# Patient Record
Sex: FEMALE | Race: WHITE | NOT HISPANIC OR LATINO | URBAN - METROPOLITAN AREA
[De-identification: names, ages, dates, MRNs, and addresses within clinical notes are randomized per-mention and may not be internally consistent; named-entity substitution may affect disease eponyms.]

---

## 2017-06-30 ENCOUNTER — EMERGENCY (EMERGENCY)
Facility: HOSPITAL | Age: 22
LOS: 1 days | Discharge: HOME | End: 2017-06-30
Attending: EMERGENCY MEDICINE | Admitting: EMERGENCY MEDICINE
Payer: COMMERCIAL

## 2017-06-30 VITALS
RESPIRATION RATE: 20 BRPM | OXYGEN SATURATION: 100 % | TEMPERATURE: 99 F | HEART RATE: 85 BPM | DIASTOLIC BLOOD PRESSURE: 83 MMHG | SYSTOLIC BLOOD PRESSURE: 131 MMHG

## 2017-06-30 VITALS — HEIGHT: 63 IN | WEIGHT: 132.94 LBS

## 2017-06-30 PROCEDURE — 12001 RPR S/N/AX/GEN/TRNK 2.5CM/<: CPT

## 2017-06-30 PROCEDURE — 99283 EMERGENCY DEPT VISIT LOW MDM: CPT | Mod: 25

## 2017-06-30 PROCEDURE — 73110 X-RAY EXAM OF WRIST: CPT | Mod: 26,RT

## 2017-06-30 PROCEDURE — 73110 X-RAY EXAM OF WRIST: CPT

## 2017-06-30 RX ORDER — TETANUS TOXOID, REDUCED DIPHTHERIA TOXOID AND ACELLULAR PERTUSSIS VACCINE, ADSORBED 5; 2.5; 8; 8; 2.5 [IU]/.5ML; [IU]/.5ML; UG/.5ML; UG/.5ML; UG/.5ML
0.5 SUSPENSION INTRAMUSCULAR ONCE
Qty: 0 | Refills: 0 | Status: DISCONTINUED | OUTPATIENT
Start: 2017-06-30 | End: 2017-06-30

## 2017-06-30 NOTE — ED PROVIDER NOTE - OBJECTIVE STATEMENT
The patient is a 22y Female complaining of r wrist laceration while at work due to a glass cup,.  pt  denies si, irrigated wound and closed with 3 stitches placed, plain films no fb to volar surface, something seen dorsum where there is no injury, from of wrist, nvi to hand.

## 2017-06-30 NOTE — ED SUB INTERN NOTE - OBJECTIVE STATEMENT FT
Pt is a 22y F presenting with a R wrist laceration from broken glass.    Pt has no significant PMH/PSH. 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 She cleaned the wound and applied a band-aid before coming to the ED. Not taking medications. Feels "numb" but with retained sensation and movement of R hand. Pt is a 22y F presenting with a R wrist laceration from broken glass.    Pt has no significant PMH/PSH. 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 She cleaned the wound and applied a band-aid before coming to the ED. Not taking medications. Feels "numb" but with retained sensation and movement of R hand. Pt denies depressed mood, SI/HI or self-harm intent.

## 2017-06-30 NOTE — ED ADULT NURSE NOTE - OBJECTIVE STATEMENT
23 y/o white female sustained a rt wrist laceration/incised injury from a glass as it broke while pitting it away. Bleeding controlled. Small lac to inside of rt wrist.

## 2017-06-30 NOTE — ED SUB INTERN NOTE - PHYSICAL EXAMINATION
RUE: 2cmx0.2cm linear vertical laceration with smooth borders to anterior aspect of R wrist extending distal-proximal. No evidence of retained foreign body or involvement of deep structures or tendons. Retained sensation to R hand and digits with retained motor function of hand.

## 2017-06-30 NOTE — ED SUB INTERN NOTE - PROGRESS NOTE DETAILS
XR wrist 3 views to r/o retained foreign body. Inclined to close with interrupted suture pending XR.

## 2020-01-17 ENCOUNTER — EMERGENCY (EMERGENCY)
Facility: HOSPITAL | Age: 25
LOS: 1 days | Discharge: HOME | End: 2020-01-17
Attending: EMERGENCY MEDICINE
Payer: COMMERCIAL

## 2020-01-17 VITALS
OXYGEN SATURATION: 100 % | TEMPERATURE: 98 F | DIASTOLIC BLOOD PRESSURE: 94 MMHG | WEIGHT: 132.06 LBS | SYSTOLIC BLOOD PRESSURE: 131 MMHG | HEART RATE: 93 BPM | RESPIRATION RATE: 17 BRPM

## 2020-01-17 PROCEDURE — 99282 EMERGENCY DEPT VISIT SF MDM: CPT

## 2020-01-17 NOTE — ED PROVIDER NOTE - PATIENT PORTAL LINK FT
You can access the FollowMyHealth Patient Portal offered by [facility] by registering at the following website: [email or web address]. By joining [employer]s FollowMyHealth portal, you will also be able to view your health information using other applications (apps) compatible with our system.

## 2020-01-17 NOTE — ED PROVIDER NOTE - EYES, MLM
Clear bilaterally, pupils equal, round and reactive to light. No signs of eye infection or injury to the eye.

## 2020-01-17 NOTE — ED PROVIDER NOTE - NSFOLLOWUPINSTRUCTIONS_ED_ALL_ED_FT
Follow up with the occupational health clinic at work on Monday    If you experience any new or worsening symptoms or if you are concerned you can always come back to the emergency for a re-evaluation.

## 2020-01-17 NOTE — ED ADULT TRIAGE NOTE - WEIGHT IN KG
History of Present Illness: I have reviewed and appreciated the technician's history and test results as outlined above.      Ms. Monica Goyal a 64 year old female is here for: visual health evaluation, IOP check and dilated eye exam.   She is wearing Acuvue Oasys for astigmatism contacts.   Her wearing time is 8 hours.   Patient states that contact lenses feel comfortable at all times and that the vision is good in both contacts.   Contact lens solution used is generic.   Patient is using no comfort gtts.   Patient has been trying out using only 1 contact in and wants to know if that's ok.     SLIT LAMP EXAM     LIDS: Normal     CORNEA: Clear and normal     CONJUNCTIVA: Clear     ANTERIOR CHAMBER: Deep and quiet     LENS:  1+ n/s     Posterior Segment Exam: OU     Vitreous: clear  Optic Nerve: flat, pink and healthy with +SVP  C/D ratio: OD: 0.25 // OS: 0.25  Macula: healthy with foveal reflex  Vessels: healthy with normal A-V ratio  Periphery: flat, healthy without tears or retinal detachment        CONTACT LENS EVALUATION: good fit and centration        TX: Order new trial lenses .  update contact lense Rx  $30.00 and recheck with charge  
Past Medical History:   Diagnosis Date   • ALLERGIC RHINITIS    • ASTHMA    • Cancer of skin 09/11/2018    Mohs surgery of Basal Cell Carcinoma on left upper lip Dr. Velasco   • CLARI DAUGHTER     Plan annual paps   • HYPERLIPIDEMIA    • Meniere's disease    • OSTEOPENIA    • Osteoporosis 2016    Lumbar       
59.9

## 2020-01-17 NOTE — ED PROVIDER NOTE - PROGRESS NOTE DETAILS
Re-assured patient. L eye flushed with NS as a precaution. No signs of L eye infection or injury to the eye. Will dc with occupational health follow up on Monday. Pt is well appearing walking with steady gait, stable for discharge and follow up without fail with medical doctor. I had a detailed discussion with the patient and/or guardian regarding the historical points, exam findings, and any diagnostic results supporting the discharge diagnosis. Pt educated on care and need for follow up. Strict return instructions and red flag signs and symptoms discussed with patient. Questions answered. Pt shows understanding of discharge information and agrees to follow.
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
## 2020-01-17 NOTE — ED PROVIDER NOTE - ENMT, MLM
Airway patent, Nasal mucosa clear. Mouth with normal mucosa. Throat has no vesicles, no oropharyngeal exudates and uvula is midline. No facial tenderness.

## 2020-02-22 ENCOUNTER — EMERGENCY (EMERGENCY)
Facility: HOSPITAL | Age: 25
LOS: 1 days | Discharge: HOME | End: 2020-02-22
Attending: EMERGENCY MEDICINE
Payer: COMMERCIAL

## 2020-02-22 VITALS
SYSTOLIC BLOOD PRESSURE: 127 MMHG | TEMPERATURE: 98 F | HEART RATE: 94 BPM | DIASTOLIC BLOOD PRESSURE: 73 MMHG | OXYGEN SATURATION: 97 % | RESPIRATION RATE: 16 BRPM | WEIGHT: 128.97 LBS | HEIGHT: 63 IN

## 2020-02-22 PROCEDURE — 76377 3D RENDER W/INTRP POSTPROCES: CPT | Mod: 26

## 2020-02-22 PROCEDURE — 70486 CT MAXILLOFACIAL W/O DYE: CPT

## 2020-02-22 PROCEDURE — 99285 EMERGENCY DEPT VISIT HI MDM: CPT

## 2020-02-22 PROCEDURE — 99284 EMERGENCY DEPT VISIT MOD MDM: CPT | Mod: 25

## 2020-02-22 PROCEDURE — 76377 3D RENDER W/INTRP POSTPROCES: CPT

## 2020-02-22 PROCEDURE — 73130 X-RAY EXAM OF HAND: CPT | Mod: 26,RT

## 2020-02-22 PROCEDURE — 73130 X-RAY EXAM OF HAND: CPT

## 2020-02-22 PROCEDURE — 70486 CT MAXILLOFACIAL W/O DYE: CPT | Mod: 26

## 2020-02-22 RX ORDER — ACETAMINOPHEN 500 MG
975 TABLET ORAL ONCE
Refills: 0 | Status: COMPLETED | OUTPATIENT
Start: 2020-02-22 | End: 2020-02-22

## 2020-02-22 RX ADMIN — Medication 975 MILLIGRAM(S): at 21:12

## 2020-02-22 NOTE — ED PROVIDER NOTE - NSFOLLOWUPINSTRUCTIONS_ED_ALL_ED_FT
Ice to swelling area.  Take Tylenol for pain as needed.  Follow up with your primary Dr. for reevaluation in 2-3days.  Follow up with eye clinic for reevaluation, call [phone] Monday for appointment in 2days.  Return for any concerns or worsening symptoms. Ice to swelling area.  Take Tylenol for pain as needed.  Follow up with your primary Dr. for reevaluation in 2-3days.  Follow up with eye clinic for reevaluation, call [phone] Monday for appointment in 2days.  Return for any concerns or worsening symptoms.    Follow up with your medical doctor in 2-3 days or call our clinic at [phone] and state you were seen in the Emergency Department and would like to be seen in clinic. You may also call [phone] to speak with a representative to assist follow up care with medicine, surgery, or specialists.    Take Tylenol/acetaminophen 1 g every six hours and supplement (if allowed by your physician) with ibuprofen 600 mg, with food or milk/maalox, every six hours which can be taken three hours apart from the Tylenol to have a layered effect.     Be sure to take no more than 4000mg or 4g of Tylenol/acetaminophen in a 24 hour period. Be sure to check your other medications to see if they include Tylenol/acetaminophen and include them in your calculations to ensure you do not take more than 4000mg or 4g of Tylenol/acetaminophen a day.    Drink at least 2 Liters or 64 Ounces of water each day (UNLESS you are supposed to restrict fluids or have a history of congestive heart failure (CHF)).    Return for any persistent, worsening symptoms, or ANY concerns at all.

## 2020-02-22 NOTE — ED PROVIDER NOTE - OBJECTIVE STATEMENT
25yo female pt, no PMHx, ambulatory c/o right eye swelling/ right hand pain s/p mechanical fall this 1am. Pt stated she tripped on a street and fell on right eye. Denies LOC. Denies headache, dizziness or N/V. Denies visual changes or discharge. Denies wearing contact lenses. Denies neck or back pain. Denies sensory changes or weakness to extremities. Denies CP/SOB/ABD pain. Denies pelvic or hip pain. Denies fever, chills or recent sickness.

## 2020-02-22 NOTE — ED PROVIDER NOTE - ATTENDING CONTRIBUTION TO CARE
Agree with above, Constantine Griffin MD, FACEP  See MDM above.  Patient educated on concerning signs and features to return to the emergency department, in layman terms, including but not limited to: nausea, vomiting, fever, chills, persistent/worsening symptoms or any concerns at all. No immediate life threatening issues present on history or clinical exam. Patient is a safe disposition home, has capacity and insight into their condition, is ambulatory in the Emergency Department with no further questions and will follow up with their doctor(s) this week. Patient understands anticipatory guidance and was given strict return and follow up precautions. The patient has been informed, in layman terms, of all concerning signs and symptoms to return to Emergency Department, the necessity to follow up with the PMD/Clinic/follow up provided within 2-3 days was explained, and the patient reports understanding of above with capacity and insight. The patient and/or family were informed of the results of their tests and are were encouraged to follow up on the findings with their doctor (as well as the need to inform their doctor of the results). The patient and/or family are aware of the need to follow up with repeat testing as applicable and report understanding of the above with capacity and insight.

## 2020-02-22 NOTE — ED PROVIDER NOTE - PATIENT PORTAL LINK FT
You can access the FollowMyHealth Patient Portal offered by [facility] by registering at the following website: [email or web address]. By joining [employer]’s FollowMyHealth portal, you will also be able to view your health information using other applications (apps) compatible with our system.

## 2020-02-22 NOTE — ED ADULT NURSE NOTE - OBJECTIVE STATEMENT
23 Yo female no pertinent medical hx c/o R-arm and R-face pain s/p fall last night. Patient reports mechanical fall, no LOC. Ecchymosis to R-eye noted. Patient A&OX3, denies chest pain, SOB, HA, N/V/D, abdominal pain, fever/chills, urinary symptoms, hematuria, weakness, dizziness, numbness, tinging. Peripheral pulses present b/l. Skin warm, dry and pink. Pt placed in position of comfort. Pt educated on call bell system and provided call bell. Bed in lowest position, wheels locked, appropriate side rails raised. Pt denies needs at this time.

## 2020-02-22 NOTE — ED PROVIDER NOTE - CLINICAL SUMMARY MEDICAL DECISION MAKING FREE TEXT BOX
Constantine Griffin MD, FACEP: In this physician's medical judgement based on clinical history and physical exam, patient with right facial injury and right hand pain status post trip and fall  will ct maxillofacial bones for orbital fracture and xray hand for pain over 5th metacarpal bone  Will follow up on analgesia, imaging, reassess and disposition as clinically indicated.

## 2020-02-22 NOTE — ED PROVIDER NOTE - PHYSICAL EXAMINATION
NAD, VSS, Afebrile, + PERRL with full EOMs, + Right periorbital/ eyelid swelling, ecchymosis and tender. + right subconjunctival hemorrhage. No spinal tender. No rib or CVA tender. Lungs clear. ABD soft, non tender. + right 5th metacarpal tender with mild swelling, full ROMs of wrist, hand and fingers. No pelvic or hip tender with full ROMs. Neuro- intact.

## 2024-11-17 NOTE — ED ADULT NURSE NOTE - HARM RISK FACTORS
Chest Pain    Chest pain can be caused by many different conditions which may or may not be dangerous. Causes include heartburn, lung infections, heart attack, blood clot in lungs, skin infections, strain or damage to muscle, cartilage, or bones, etc. Lab tests or other studies including an electrocardiogram (EKG) may have been performed to find the cause of your pain. Make sure to follow up with a cardiologist or as instructed by your health care professional.    SEEK IMMEDIATE MEDICAL CARE IF YOU HAVE THE FOLLOWING SYMPTOMS: worsening chest pain, coughing up blood, unexplained back/neck/jaw pain, severe abdominal pain, dizziness or lightheadedness, shortness of breath, sweaty or clammy skin, vomiting, or racing heart beat. These symptoms may represent a serious problem that is an emergency. Do not wait to see if the symptoms will go away. Get medical help right away. Call your local emergency services (911 in the [place] Do not drive yourself to the hospital.
no

## 2025-06-02 NOTE — ED PROVIDER NOTE - CARE PLAN
02-Jun-2025 00:00
Principal Discharge DX:	Right eye injury, initial encounter  Secondary Diagnosis:	Subconjunctival hemorrhage, right  Secondary Diagnosis:	Hand pain, right